# Patient Record
Sex: MALE | Race: BLACK OR AFRICAN AMERICAN | Employment: UNEMPLOYED | URBAN - METROPOLITAN AREA
[De-identification: names, ages, dates, MRNs, and addresses within clinical notes are randomized per-mention and may not be internally consistent; named-entity substitution may affect disease eponyms.]

---

## 2022-10-06 ENCOUNTER — HOSPITAL ENCOUNTER (INPATIENT)
Age: 22
LOS: 1 days | Discharge: HOME | DRG: 885 | End: 2022-10-07
Attending: EMERGENCY MEDICINE | Admitting: PSYCHIATRY & NEUROLOGY
Payer: COMMERCIAL

## 2022-10-06 DIAGNOSIS — F23 ACUTE PSYCHOSIS (HCC): Primary | ICD-10-CM

## 2022-10-06 DIAGNOSIS — F12.10 MARIJUANA ABUSE: ICD-10-CM

## 2022-10-06 DIAGNOSIS — Z00.8 MEDICAL CLEARANCE FOR PSYCHIATRIC ADMISSION: ICD-10-CM

## 2022-10-06 DIAGNOSIS — F30.9 MANIA (HCC): ICD-10-CM

## 2022-10-06 PROBLEM — F39 UNSPECIFIED MOOD (AFFECTIVE) DISORDER (HCC): Status: ACTIVE | Noted: 2022-10-06

## 2022-10-06 LAB
ALBUMIN SERPL-MCNC: 5.1 G/DL (ref 3.5–5)
ALBUMIN/GLOB SERPL: 1.4 {RATIO} (ref 1.1–2.2)
ALP SERPL-CCNC: 46 U/L (ref 45–117)
ALT SERPL-CCNC: 56 U/L (ref 12–78)
AMPHET UR QL SCN: NEGATIVE
ANION GAP SERPL CALC-SCNC: 14 MMOL/L (ref 5–15)
AST SERPL-CCNC: 71 U/L (ref 15–37)
BARBITURATES UR QL SCN: NEGATIVE
BASOPHILS # BLD: 0 K/UL (ref 0–0.1)
BASOPHILS NFR BLD: 1 % (ref 0–1)
BENZODIAZ UR QL: NEGATIVE
BILIRUB SERPL-MCNC: 0.8 MG/DL (ref 0.2–1)
BUN SERPL-MCNC: 14 MG/DL (ref 6–20)
BUN/CREAT SERPL: 10 (ref 12–20)
CALCIUM SERPL-MCNC: 9.4 MG/DL (ref 8.5–10.1)
CANNABINOIDS UR QL SCN: POSITIVE
CHLORIDE SERPL-SCNC: 96 MMOL/L (ref 97–108)
CO2 SERPL-SCNC: 23 MMOL/L (ref 21–32)
COCAINE UR QL SCN: NEGATIVE
CREAT SERPL-MCNC: 1.39 MG/DL (ref 0.7–1.3)
DATE LAST DOSE: ABNORMAL
DIFFERENTIAL METHOD BLD: ABNORMAL
DRUG SCRN COMMENT,DRGCM: ABNORMAL
EOSINOPHIL # BLD: 0.1 K/UL (ref 0–0.4)
EOSINOPHIL NFR BLD: 1 % (ref 0–7)
ERYTHROCYTE [DISTWIDTH] IN BLOOD BY AUTOMATED COUNT: 12 % (ref 11.5–14.5)
ETHANOL SERPL-MCNC: <10 MG/DL
FLUAV RNA SPEC QL NAA+PROBE: NOT DETECTED
FLUBV RNA SPEC QL NAA+PROBE: NOT DETECTED
GLOBULIN SER CALC-MCNC: 3.7 G/DL (ref 2–4)
GLUCOSE SERPL-MCNC: 84 MG/DL (ref 65–100)
HCT VFR BLD AUTO: 40.8 % (ref 36.6–50.3)
HGB BLD-MCNC: 13.4 G/DL (ref 12.1–17)
IMM GRANULOCYTES # BLD AUTO: 0 K/UL (ref 0–0.04)
IMM GRANULOCYTES NFR BLD AUTO: 0 % (ref 0–0.5)
LITHIUM SERPL-SCNC: <0.2 MMOL/L (ref 0.6–1.2)
LYMPHOCYTES # BLD: 2.5 K/UL (ref 0.8–3.5)
LYMPHOCYTES NFR BLD: 40 % (ref 12–49)
MCH RBC QN AUTO: 29.9 PG (ref 26–34)
MCHC RBC AUTO-ENTMCNC: 32.8 G/DL (ref 30–36.5)
MCV RBC AUTO: 91.1 FL (ref 80–99)
METHADONE UR QL: NEGATIVE
MONOCYTES # BLD: 0.9 K/UL (ref 0–1)
MONOCYTES NFR BLD: 14 % (ref 5–13)
NEUTS SEG # BLD: 2.8 K/UL (ref 1.8–8)
NEUTS SEG NFR BLD: 44 % (ref 32–75)
NRBC # BLD: 0 K/UL (ref 0–0.01)
NRBC BLD-RTO: 0 PER 100 WBC
OPIATES UR QL: NEGATIVE
PCP UR QL: NEGATIVE
PLATELET # BLD AUTO: 304 K/UL (ref 150–400)
PMV BLD AUTO: 9.8 FL (ref 8.9–12.9)
POTASSIUM SERPL-SCNC: 3.6 MMOL/L (ref 3.5–5.1)
PROT SERPL-MCNC: 8.8 G/DL (ref 6.4–8.2)
RBC # BLD AUTO: 4.48 M/UL (ref 4.1–5.7)
REPORTED DOSE,DOSE: ABNORMAL UNITS
REPORTED DOSE/TIME,TMG: ABNORMAL
SARS-COV-2, COV2: NOT DETECTED
SODIUM SERPL-SCNC: 133 MMOL/L (ref 136–145)
WBC # BLD AUTO: 6.3 K/UL (ref 4.1–11.1)

## 2022-10-06 PROCEDURE — 36415 COLL VENOUS BLD VENIPUNCTURE: CPT

## 2022-10-06 PROCEDURE — 74011000250 HC RX REV CODE- 250: Performed by: EMERGENCY MEDICINE

## 2022-10-06 PROCEDURE — 80178 ASSAY OF LITHIUM: CPT

## 2022-10-06 PROCEDURE — 85025 COMPLETE CBC W/AUTO DIFF WBC: CPT

## 2022-10-06 PROCEDURE — 74011250636 HC RX REV CODE- 250/636: Performed by: EMERGENCY MEDICINE

## 2022-10-06 PROCEDURE — 96372 THER/PROPH/DIAG INJ SC/IM: CPT

## 2022-10-06 PROCEDURE — 80053 COMPREHEN METABOLIC PANEL: CPT

## 2022-10-06 PROCEDURE — 99284 EMERGENCY DEPT VISIT MOD MDM: CPT

## 2022-10-06 PROCEDURE — 87636 SARSCOV2 & INF A&B AMP PRB: CPT

## 2022-10-06 PROCEDURE — 65220000003 HC RM SEMIPRIVATE PSYCH

## 2022-10-06 PROCEDURE — 80175 DRUG SCREEN QUAN LAMOTRIGINE: CPT

## 2022-10-06 PROCEDURE — 80307 DRUG TEST PRSMV CHEM ANLYZR: CPT

## 2022-10-06 PROCEDURE — 82077 ASSAY SPEC XCP UR&BREATH IA: CPT

## 2022-10-06 RX ORDER — LAMOTRIGINE 150 MG/1
TABLET ORAL DAILY
COMMUNITY
End: 2022-10-06

## 2022-10-06 RX ORDER — LITHIUM CARBONATE 150 MG/1
200 CAPSULE ORAL 2 TIMES DAILY WITH MEALS
Status: ON HOLD | COMMUNITY
End: 2022-10-07

## 2022-10-06 RX ORDER — LAMOTRIGINE 200 MG/1
200 TABLET ORAL DAILY
Status: ON HOLD | COMMUNITY
End: 2022-10-07 | Stop reason: SDUPTHER

## 2022-10-06 RX ORDER — MIDAZOLAM HYDROCHLORIDE 5 MG/ML
5 INJECTION INTRAMUSCULAR; INTRAVENOUS
Status: COMPLETED | OUTPATIENT
Start: 2022-10-06 | End: 2022-10-06

## 2022-10-06 RX ADMIN — MIDAZOLAM 5 MG: 5 INJECTION INTRAMUSCULAR; INTRAVENOUS at 20:39

## 2022-10-06 RX ADMIN — ZIPRASIDONE MESYLATE 20 MG: 20 INJECTION, POWDER, LYOPHILIZED, FOR SOLUTION INTRAMUSCULAR at 22:00

## 2022-10-06 RX ADMIN — OLANZAPINE 10 MG: 10 INJECTION, POWDER, FOR SOLUTION INTRAMUSCULAR at 15:39
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
## 2022-10-06 NOTE — ED NOTES
Bedside shift change report given to [facility] (oncoming nurse) by Omero Ramos RN (offgoing nurse). Report included the following information SBAR and ED Summary.

## 2022-10-06 NOTE — ED NOTES
Emergency Department Nursing Plan of Care       The Nursing Plan of Care is developed from the Nursing assessment and Emergency Department Attending provider initial evaluation. The plan of care may be reviewed in the ED Provider note.     The Plan of Care was developed with the following considerations:   Patient / Family readiness to learn indicated by:verbalized understanding  Persons(s) to be included in education: patient  Barriers to Learning/Limitations:No    Signed     Nino Vazquez RN    10/6/2022   3:27 PM

## 2022-10-06 NOTE — ED NOTES
Pts parents came to hospital for update. Parents were informed that pt was asleep and could not give consent for his information to be shared with them. Parents left their numbers with this nurse and numbers will be placed in pt chart.
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
## 2022-10-06 NOTE — ED NOTES
Pt getting aggressive with police and security in hallway/outside of room.  Pt was redirected and is now back in room

## 2022-10-06 NOTE — ED NOTES
Pt emotional, loudly talkative, and banging his fists on his stretcher however pt cooperative with blood draw, providing urine sample, and med administration. Pt not violent toward staff or visitors.

## 2022-10-07 VITALS
HEART RATE: 98 BPM | RESPIRATION RATE: 18 BRPM | SYSTOLIC BLOOD PRESSURE: 130 MMHG | DIASTOLIC BLOOD PRESSURE: 80 MMHG | OXYGEN SATURATION: 98 % | TEMPERATURE: 98.3 F

## 2022-10-07 PROBLEM — F29 UNSPECIFIED PSYCHOSIS NOT DUE TO A SUBSTANCE OR KNOWN PHYSIOLOGICAL CONDITION (HCC): Status: ACTIVE | Noted: 2022-10-07

## 2022-10-07 PROCEDURE — 74011250637 HC RX REV CODE- 250/637: Performed by: PSYCHIATRY & NEUROLOGY

## 2022-10-07 PROCEDURE — 74011250637 HC RX REV CODE- 250/637

## 2022-10-07 RX ORDER — LORAZEPAM 2 MG/ML
1 INJECTION INTRAMUSCULAR
Status: DISCONTINUED | OUTPATIENT
Start: 2022-10-07 | End: 2022-10-07 | Stop reason: HOSPADM

## 2022-10-07 RX ORDER — OLANZAPINE 5 MG/1
5 TABLET ORAL
Status: DISCONTINUED | OUTPATIENT
Start: 2022-10-07 | End: 2022-10-07 | Stop reason: HOSPADM

## 2022-10-07 RX ORDER — DIPHENHYDRAMINE HYDROCHLORIDE 50 MG/ML
50 INJECTION, SOLUTION INTRAMUSCULAR; INTRAVENOUS
Status: DISCONTINUED | OUTPATIENT
Start: 2022-10-07 | End: 2022-10-07 | Stop reason: HOSPADM

## 2022-10-07 RX ORDER — OLANZAPINE 15 MG/1
15 TABLET ORAL EVERY EVENING
Qty: 30 TABLET | Refills: 0 | Status: SHIPPED | OUTPATIENT
Start: 2022-10-07

## 2022-10-07 RX ORDER — TRAZODONE HYDROCHLORIDE 50 MG/1
50 TABLET ORAL
Status: DISCONTINUED | OUTPATIENT
Start: 2022-10-07 | End: 2022-10-07 | Stop reason: HOSPADM

## 2022-10-07 RX ORDER — HALOPERIDOL 5 MG/ML
5 INJECTION INTRAMUSCULAR
Status: DISCONTINUED | OUTPATIENT
Start: 2022-10-07 | End: 2022-10-07 | Stop reason: HOSPADM

## 2022-10-07 RX ORDER — LAMOTRIGINE 200 MG/1
200 TABLET ORAL DAILY
Qty: 30 TABLET | Refills: 0 | Status: SHIPPED | OUTPATIENT
Start: 2022-10-07

## 2022-10-07 RX ORDER — ACETAMINOPHEN 325 MG/1
650 TABLET ORAL
Status: DISCONTINUED | OUTPATIENT
Start: 2022-10-07 | End: 2022-10-07 | Stop reason: HOSPADM

## 2022-10-07 RX ORDER — LAMOTRIGINE 100 MG/1
200 TABLET ORAL DAILY
Status: DISCONTINUED | OUTPATIENT
Start: 2022-10-07 | End: 2022-10-07 | Stop reason: HOSPADM

## 2022-10-07 RX ORDER — HYDROXYZINE 25 MG/1
50 TABLET, FILM COATED ORAL
Status: DISCONTINUED | OUTPATIENT
Start: 2022-10-07 | End: 2022-10-07 | Stop reason: HOSPADM

## 2022-10-07 RX ORDER — OLANZAPINE 10 MG/1
10 TABLET ORAL EVERY EVENING
Status: DISCONTINUED | OUTPATIENT
Start: 2022-10-07 | End: 2022-10-07

## 2022-10-07 RX ORDER — BENZTROPINE MESYLATE 1 MG/1
1 TABLET ORAL
Status: DISCONTINUED | OUTPATIENT
Start: 2022-10-07 | End: 2022-10-07 | Stop reason: HOSPADM

## 2022-10-07 RX ORDER — ADHESIVE BANDAGE
30 BANDAGE TOPICAL DAILY PRN
Status: DISCONTINUED | OUTPATIENT
Start: 2022-10-07 | End: 2022-10-07 | Stop reason: HOSPADM

## 2022-10-07 RX ADMIN — HYDROXYZINE HYDROCHLORIDE 50 MG: 25 TABLET, FILM COATED ORAL at 14:47

## 2022-10-07 RX ADMIN — OLANZAPINE 15 MG: 10 TABLET, FILM COATED ORAL at 16:55

## 2022-10-07 RX ADMIN — LAMOTRIGINE 200 MG: 100 TABLET ORAL at 13:34

## 2022-10-07 RX ADMIN — OLANZAPINE 5 MG: 5 TABLET, FILM COATED ORAL at 14:48
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
## 2022-10-07 NOTE — ED NOTES
Spoke with Glenna from [facility] and updated her on the conversation that this RN had with pt mother. Number of psychiatrist and family given to [facility]. Spoke with mother earlier on the phone. Received information that pt is seen by Psychiatrist Rashaun Gonzalez MD in [place]. Mother reports that pt is bipolar, hx of serenity, hx of hospitalizations, and has \"daily check-ins\" with her over the phone with his doctor. Rashaun Gonzalez [phone]    Pt takes 200mg oral Lamictal Daily and 200mg oral Lithium BID. Requested MD place orders to check levels.
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
## 2022-10-07 NOTE — ED NOTES
TRANSFER - OUT REPORT:    Verbal report given to Tucker Larson RN(name) on Alpesh Tracy  being transferred to U(unit) for routine progression of care       Report consisted of patients Situation, Background, Assessment and   Recommendations(SBAR). Information from the following report(s) SBAR, ED Summary, Procedure Summary, MAR, Recent Results, and Quality Measures was reviewed with the receiving nurse. RN also made aware that pt was medicated at 2200. RN also notified that pt refused to get into gown/psych safe clothing. Opportunity for questions and clarification was provided.       Patient transported with:   Patient's medications from [facility]  O

## 2022-10-07 NOTE — ED NOTES
Primary Nurse Maribell Rivera RN and Becka, RN performed a dual skin assessment on this patient No impairment noted  Mitchell score is 23

## 2022-10-07 NOTE — PROGRESS NOTES
Laboratory monitoring for mood stabilizer and antipsychotics:    Recommended baseline monitoring has not been completed based on this patient's current medication regimen. The following labs have been ordered to complete baseline monitoring: height, weight, lipid panel, HbA1c, TSH      The patient is currently taking the following medication(s):   Current Facility-Administered Medications   Medication Dose Route Frequency    lamoTRIgine (LaMICtal) tablet 200 mg  200 mg Oral DAILY    OLANZapine (ZyPREXA) tablet 15 mg  15 mg Oral QPM       Serum Drug Level(s)  LITHIUM  Lab Results   Component Value Date/Time    Lithium level <0.20 (L) 10/06/2022 03:50 PM       Height, Weight, BMI Estimation  There is no height or weight on file to calculate BMI. Renal Function, Hepatic Function and Chemistry  CrCl cannot be calculated (Unknown ideal weight.). Lab Results   Component Value Date/Time    Sodium 133 (L) 10/06/2022 03:34 PM    Potassium 3.6 10/06/2022 03:34 PM    Chloride 96 (L) 10/06/2022 03:34 PM    CO2 23 10/06/2022 03:34 PM    Anion gap 14 10/06/2022 03:34 PM    Glucose 84 10/06/2022 03:34 PM    BUN 14 10/06/2022 03:34 PM    Creatinine 1.39 (H) 10/06/2022 03:34 PM    BUN/Creatinine ratio 10 (L) 10/06/2022 03:34 PM    Calcium 9.4 10/06/2022 03:34 PM    ALT (SGPT) 56 10/06/2022 03:34 PM    Alk.  phosphatase 46 10/06/2022 03:34 PM    Protein, total 8.8 (H) 10/06/2022 03:34 PM    Albumin 5.1 (H) 10/06/2022 03:34 PM    Globulin 3.7 10/06/2022 03:34 PM    A-G Ratio 1.4 10/06/2022 03:34 PM    Bilirubin, total 0.8 10/06/2022 03:34 PM       Lab Results   Component Value Date/Time    Glucose 84 10/06/2022 03:34 PM       Hematology  Lab Results   Component Value Date/Time    WBC 6.3 10/06/2022 03:34 PM    HGB 13.4 10/06/2022 03:34 PM    HCT 40.8 10/06/2022 03:34 PM    PLATELET 069 24/57/0416 03:34 PM    MCV 91.1 10/06/2022 03:34 PM       Vitals  Visit Vitals  BP (!) 142/84 (BP 1 Location: Right arm)   Pulse 84   Temp 98.6 °F (37 °C)   Resp 20   SpO2 99%         Jana Chamberlain, PHARMD, BCPS  [phone] (pharmacy)

## 2022-10-07 NOTE — PROGRESS NOTES
[facility] Admission Pharmacy Medication Reconciliation     Information obtained from: Patient interview, RxQuery  RxQuery data available1: Yes    Comments/recommendations:  Difficult to determine level of compliance with lamotrigine - patient required team to watch YouTube video of his cousin prior to answering questions related to medications. States he did take a dose yesterday but then later alluded to taking 2 doses yesterday. Per RxQuery, olanzapine 20 mg nightly was filled in May 2022. Medications are being prescribed by Dr. Lui Johnson who appears to be a psychiatrist based in [place]. Patient [facility]. Medication changes (since last review): Added  None  Removed  Lithium  Adjusted  Lamotrigine frequency added     1RxQuery pharmacy benefit data reflects medications filled and processed through the patient's insurance, however                this data does NOT capture whether the medication was picked up or is currently being taken by the patient. Patient allergies: Allergies as of 10/06/2022    (No Known Allergies)         Prior to Admission Medications   Prescriptions Last Dose Informant Patient Reported? Taking?   lamoTRIgine (LaMICtal) 200 mg tablet 10/6/2022  Yes Yes   Sig: Take 200 mg by mouth daily.       Facility-Administered Medications: None        Thank you,  Omkar Feliciano,  [facility], [facility]  Desk: [phone] ([id number])  Pharmacy: [phone] ([id number])

## 2022-10-07 NOTE — BH NOTES
Spoke at length with patient's mother (signed ANA ROSA in chart) re: Girish's current presentation and symptoms. Expressed concerns re: safety in the community and parent's ability to manage current behaviors on an outpatient basis. Both parents expressed confidence in their ability to manage symptoms. They were provided discharge instructions, including medication schedule and where to  prescribed medication before going back to [place]. Per Dr. Seng Cardenas this is not an [facility] discharge.  Will continue to monitor

## 2022-10-07 NOTE — ED NOTES
[facility] staff requested pt come upstairs at 2230. Charge RN made aware, pt will go upstairs at 2230.

## 2022-10-07 NOTE — BH NOTES
Spoke with the parents after the patient talked to Dr. Jeanette Valle concerning his discharge. The doctor stated that if the parents are willing to take full responsibility then he can be discharged today. The family agree to take responsibility, notified the primary nurse.
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
## 2022-10-07 NOTE — ED NOTES
Pt being transferred upstairs with police and security with 1 bag of patient belongings, including medications. Pt still in bilat forensic wrist and ankle restraints at transfer. Restraints continued on transport.

## 2022-10-07 NOTE — PROGRESS NOTES
Suha Sosa arrived on the unit from ER and stated he just wanted to go to sleep. He was able to participate partially with his admission but elected to go to his room for sleep. He denied SI, HI, AVH and pain. He stated his friend had  last week from a drug overdose and he witnessed this as it occurred and it was awful for him. He states he has not been taking his medication 'for awhile'. Skin search was done with Anders Romero. Patient changed into paper scrubs and went to bed. He appears to be sleeping well.

## 2022-10-07 NOTE — ED NOTES
Primary Nurse Ludy Salgado RN and Becka, RN performed a dual skin assessment on this patient No impairment noted  Mitchell score is 23

## 2022-10-07 NOTE — PROGRESS NOTES
Problem: Falls - Risk of  Goal: *Absence of Falls  Description: Document Becky Thornton Fall Risk and appropriate interventions in the flowsheet.   Outcome: Progressing Towards Goal  Note: Fall Risk Interventions:  Problem: Patient Education: Go to Patient Education Activity  Goal: Patient/Family Education  Outcome: Progressing Towards Goal     Problem: Depressed Mood (Adult/Pediatric)  Goal: *STG: Remains safe in hospital  Outcome: Progressing Towards Goal     Problem: Altered Thought Process (Adult/Pediatric)  Goal: *STG: Remains safe in hospital  Outcome: Progressing Towards Goal  Goal: *STG: Attends activities and groups  Outcome: Progressing Towards Goal     Problem: Patient Education: Go to Patient Education Activity  Goal: Patient/Family Education  Outcome: Progressing Towards Goal

## 2022-10-07 NOTE — PROGRESS NOTES
Brenden Hein is awake and visible in the milieu interacting with peers and staff. He presents as hyperverbal with pressured loud speech. Tp's are disorganized, tangential and grandiose. He states often \"I am the smartest person up here\" He sees signs in all of the numbers on the unit, stating that they are all multiples of 3 & 6 and that this proves a conspiracy against him. He declines any explanation of TDO/commitment process and insists that Dr. Zayra Anderson said he was going to be released today. Affect tense, angry, mood irritable and labile. Actively flirting with female staff on the unit and has trouble maintaining physical boundaries with other patients. PRN hydroxyzine and Zyprexa given for agitation. Currently threatening to Nina Cos out of the unit\" while on the phone with his mother. Will continue to assess for need for IM medication.  Compliant with scheduled lamictal. Will continue to monitor closely for safety    Problem: Altered Thought Process (Adult/Pediatric)  Goal: *STG: Complies with medication therapy  Outcome: Progressing Towards Goal  Goal: *STG: Decreased delusional thinking  Outcome: Not Progressing Towards Goal

## 2022-10-07 NOTE — PROGRESS NOTES
Behavioral Services  Medicare Certification Upon Admission    I certify that this patient's inpatient psychiatric hospital admission is medically necessary for:    [x] (1) Treatment which could reasonably be expected to improve this patient's condition,       [x] (2) Or for diagnostic study;     AND     [x](2) The inpatient psychiatric services are provided while the individual is under the care of a physician and are included in the individualized plan of care.     Estimated length of stay/service 5 - 7 days    Plan for post-hospital care per social work    Electronically signed by Mary Avendano MD on 10/7/2022 at 12:36 PM
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
## 2022-10-10 LAB — LAMOTRIGINE SERPL-MCNC: 10.4 UG/ML (ref 2–20)
